# Patient Record
Sex: MALE | Race: WHITE | NOT HISPANIC OR LATINO | Employment: FULL TIME | URBAN - METROPOLITAN AREA
[De-identification: names, ages, dates, MRNs, and addresses within clinical notes are randomized per-mention and may not be internally consistent; named-entity substitution may affect disease eponyms.]

---

## 2017-05-17 ENCOUNTER — OFFICE VISIT (OUTPATIENT)
Dept: FAMILY MEDICINE | Facility: CLINIC | Age: 37
End: 2017-05-17
Payer: COMMERCIAL

## 2017-05-17 VITALS — RESPIRATION RATE: 16 BRPM | TEMPERATURE: 98.5 F

## 2017-05-17 DIAGNOSIS — Z23 NEED FOR VACCINATION: ICD-10-CM

## 2017-05-17 DIAGNOSIS — Z71.84 TRAVEL ADVICE ENCOUNTER: Primary | ICD-10-CM

## 2017-05-17 PROCEDURE — 99402 PREV MED CNSL INDIV APPRX 30: CPT | Mod: 25 | Performed by: NURSE PRACTITIONER

## 2017-05-17 PROCEDURE — 90686 IIV4 VACC NO PRSV 0.5 ML IM: CPT | Mod: GA | Performed by: NURSE PRACTITIONER

## 2017-05-17 PROCEDURE — 90471 IMMUNIZATION ADMIN: CPT | Mod: GA | Performed by: NURSE PRACTITIONER

## 2017-05-17 RX ORDER — AZITHROMYCIN 500 MG/1
500 TABLET, FILM COATED ORAL DAILY
Qty: 3 TABLET | Refills: 0 | Status: SHIPPED | OUTPATIENT
Start: 2017-05-17 | End: 2017-05-20

## 2017-05-17 RX ORDER — ATOVAQUONE AND PROGUANIL HYDROCHLORIDE 250; 100 MG/1; MG/1
1 TABLET, FILM COATED ORAL DAILY
Qty: 30 TABLET | Refills: 0 | Status: SHIPPED | OUTPATIENT
Start: 2017-05-17 | End: 2018-07-19

## 2017-05-17 NOTE — PATIENT INSTRUCTIONS
Today May 17, 2017 you received the    Flu Vaccine  .    These appointments can be made as a NURSE ONLY visit.    **It is very important for the vaccinations to be given on the scheduled day(s), this helps ensure you receive the full effectiveness of the vaccine.**    Please call [facility] with any questions [phone]    Thank you for visiting [facility]

## 2017-05-17 NOTE — MR AVS SNAPSHOT
After Visit Summary   5/17/2017    Raúl Haque    MRN: [id number]           Patient Information     Date Of Birth          1980        Visit Information        Provider Department      5/17/2017 8:00 AM Michelle Dang APRN CNP [facility]        Todays Diagnoses     Travel advice encounter    -  1    Need for vaccination          Care Instructions    Today May 17, 2017 you received the    Flu Vaccine  .    These appointments can be made as a NURSE ONLY visit.    **It is very important for the vaccinations to be given on the scheduled day(s), this helps ensure you receive the full effectiveness of the vaccine.**    Please call [facility] with any questions [phone]    Thank you for visiting [facility]            Follow-ups after your visit        Who to contact     If you have questions or need follow up information about [facility] clinic visit or your schedule please contact [facility] directly at [phone].  Normal or non-critical lab and imaging results will be communicated to you by [email or web address], letter or phone within 4 business days after the clinic has received the results. If you do not hear from us within 7 days, please contact the clinic through [email or web address] or phone. If you have a critical or abnormal lab result, we will notify you by phone as soon as possible.  Submit refill requests through [place] or call your pharmacy and they will forward the refill request to us. Please allow 3 business days for your refill to be completed.          Additional Information About Your Visit        [email or web address] Information     [place] gives you secure access to your electronic health record. If you see a primary care provider, you can also send messages to your care team and make appointments. If you have questions, please call your primary care clinic.  If you do not have a primary care provider, please call [phone] and they will  assist you.        Care EveryWhere ID     This is your Care EveryWhere ID. This could be used by other organizations to access your [facility] medical records  [id number]        Your Vitals Were     Temperature Respirations                98.5  F (36.9  C) (Oral) 16           Blood Pressure from Last 3 Encounters:   03/04/16 134/88   08/04/14 142/83    Weight from Last 3 Encounters:   03/04/16 245 lb 11.2 oz (111.4 kg)   08/04/14 224 lb 9.6 oz (101.9 kg)              We Performed the Following     HC FLU VAC PRESRV FREE QUAD SPLIT VIR 3+YRS IM          Today's Medication Changes          These changes are accurate as of: 5/17/17  8:46 AM.  If you have any questions, ask your nurse or doctor.               Start taking these medicines.        Dose/Directions    atovaquone-proguanil 250-100 MG per tablet   Commonly known as:  MALARONE   Used for:  Need for vaccination, Travel advice encounter   Started by:  Michelle Dang APRN CNP        Dose:  1 tablet   Take 1 tablet by mouth daily Start 2 days before exposure to Malaria and continue daily till  7 days after exposure.   Quantity:  30 tablet   Refills:  0       azithromycin 500 MG tablet   Commonly known as:  ZITHROMAX   Used for:  Travel advice encounter   Started by:  Michelle Dang APRN CNP        Dose:  500 mg   Take 1 tablet (500 mg) by mouth daily for 3 doses Take 1 tablet a day for up to 3 days for severe diarrhea   Quantity:  3 tablet   Refills:  0       typhoid CR capsule   Commonly known as:  VIVOTIF   Used for:  Need for vaccination, Travel advice encounter   Started by:  Michelle Dang APRN CNP        Dose:  1 capsule   Take 1 capsule by mouth every other day   Quantity:  4 capsule   Refills:  0            Where to get your medicines      These medications were sent to [facility] [address] [place] [address]  [address] [place] [address]     Phone:  [phone]     atovaquone-proguanil 250-100 MG per tablet     azithromycin 500 MG tablet    typhoid CR capsule                Primary Care Provider Office Phone # Fax #    Kati Clement [phone]       [facility] [address] [address]        Thank you!     Thank you for choosing [facility]  for your care. Our goal is always to provide you with excellent care. Hearing back from our patients is one way we can continue to improve our services. Please take a few minutes to complete the written survey that you may receive in the mail after your visit with us. Thank you!             Your Updated Medication List - Protect others around you: Learn how to safely use, store and throw away your medicines at www.disposemymeds.org.          This list is accurate as of: 5/17/17  8:46 AM.  Always use your most recent med list.                   Brand Name Dispense Instructions for use    atovaquone-proguanil 250-100 MG per tablet    MALARONE    30 tablet    Take 1 tablet by mouth daily Start 2 days before exposure to Malaria and continue daily till  7 days after exposure.       azithromycin 500 MG tablet    ZITHROMAX    3 tablet    Take 1 tablet (500 mg) by mouth daily for 3 doses Take 1 tablet a day for up to 3 days for severe diarrhea       typhoid CR capsule    VIVOTIF    4 capsule    Take 1 capsule by mouth every other day

## 2017-05-17 NOTE — NURSING NOTE
"Chief Complaint   Patient presents with     Travel Clinic     [place]      Temp 98.5  F (36.9  C) (Oral)  Resp 16 Estimated body mass index is 38.2 kg/(m^2) as calculated from the following:    Height as of 3/4/16: 5' 7.25\" (1.708 m).    Weight as of 3/4/16: 245 lb 11.2 oz (111.4 kg).  bp completed using cuff size: NA (Not Taken)       Health Maintenance addressed:  NONE    n/a    Lala Curry MA     "

## 2017-05-17 NOTE — NURSING NOTE
Screening Questionnaire for Adult Immunization    Are you sick today?   No   Do you have allergies to medications, food, a vaccine component or latex?   No   Have you ever had a serious reaction after receiving a vaccination?   No   Do you have a long-term health problem with heart disease, lung disease, asthma, kidney disease, metabolic disease (e.g. diabetes), anemia, or other blood disorder?   No   Do you have cancer, leukemia, HIV/AIDS, or any other immune system problem?   No   In the past 3 months, have you taken medications that affect  your immune system, such as prednisone, other steroids, or anticancer drugs; drugs for the treatment of rheumatoid arthritis, Crohn s disease, or psoriasis; or have you had radiation treatments?   No   Have you had a seizure, or a brain or other nervous system problem?   No   During the past year, have you received a transfusion of blood or blood     products, or been given immune (gamma) globulin or antiviral drug?   No   For women: Are you pregnant or is there a chance you could become        pregnant during the next month?   No   Have you received any vaccinations in the past 4 weeks?   No     Immunization questionnaire answers were all negative.      MNVFC doesn't apply on this patient    Prior to injection verified patient identity using patient's name and date of birth.    Per orders of DONOVAN Dang, injection of Flu  given by Lala Curry. Patient instructed to remain in clinic for 20 minutes afterwards, and to report any adverse reaction to me immediately.       Screening performed by Lala Curry on 5/17/2017 at 8:58 AM.

## 2017-05-17 NOTE — PROGRESS NOTES
Nurse Note      Itinerary:  [place]       Departure Date:       Return Date:       Length of Trip 6 weeks       Reason for Travel: Adoption           Urban or rural: both      Accommodations: Apartment        IMMUNIZATION HISTORY  Have you received any immunizations within the past 4 weeks?  No  Have you ever fainted from having your blood drawn or from an injection?  No  Have you ever had a fever reaction to vaccination?  No  Have you ever had any bad reaction or side effect from any vaccination?  No  Have you ever had hepatitis A or B vaccine?  Yes  Do you live (or work closely) with anyone who has AIDS, an AIDS-like condition, any other immune disorder or who is on chemotherapy for cancer?  Yes  Do you have a family history of immunodeficiency?  No  Have you received any injection of immune globulin or any blood products during the past 12 months?  No    Patient roomed by Lala Curry MA       Subjective  Raúl Haque is a 37 year old male seen today with spouse  with children for counsultation for international travel to [place] for adoption.  Patient will be departing in  Within 6 week(s) and staying for   2 month(s) and  traveling with family member(s).      Patient itinerary :  will be in the [place] region [place] which presents risk for Malaria, Dengue Fever, Chikungungya, Zika, Schistosomiasis, food borne illnesses, motor vehicle accidents and Typhoid. exposure.      Patient's activities will include sightseeing and adoption.    Patient's country of birth is [place]    Special medical concerns: none  Pre-travel questionnaire was completed by patient and reviewed by provider.     Vitals: Temp 98.5  F (36.9  C) (Oral)  Resp 16  BMI= There is no height or weight on file to calculate BMI.    EXAM:  General:  Well-nourished, well-developed in no acute distress.  Appears to be stated age, interacts appropriately and expresses understanding of information given to  patient.    Current Outpatient Prescriptions   Medication Sig Dispense Refill     atovaquone-proguanil (MALARONE) 250-100 MG per tablet Take 1 tablet by mouth daily Start 2 days before exposure to Malaria and continue daily till  7 days after exposure. 30 tablet 0     typhoid (VIVOTIF) CR capsule Take 1 capsule by mouth every other day 4 capsule 0     azithromycin (ZITHROMAX) 500 MG tablet Take 1 tablet (500 mg) by mouth daily for 3 doses Take 1 tablet a day for up to 3 days for severe diarrhea 3 tablet 0     Patient Active Problem List   Diagnosis     Depression, major, in remission (H)     Obesity     Snoring     No Known Allergies      Immunizations discussed include:   Hepatitis A:  Up to date  Hepatitis B: Up to date  Influenza: Ordered/given today, risks, benefits and side effects reviewed  Typhoid: oral Rx given  Rabies: Declined  Not concerned about risk of disease  Yellow Fever: Not indicated  Japanese Encephalitis: Not indicated  Meningococcus: Not indicated  Tetanus/Diphtheria: Up to date  Measles/Mumps/Rubella: Up to date  Cholera: Not needed  Polio: Up to date  Pneumococcal: Under age of 65  Varicella: Immune by disease history per patient report  Zostavax:  Not indicated  HPV:  Not indicated  TB:  Possible post travel    Altitude Exposure on this trip: no    ASSESSMENT/PLAN:    ICD-10-CM    1. Travel advice encounter Z71.89 atovaquone-proguanil (MALARONE) 250-100 MG per tablet     typhoid (VIVOTIF) CR capsule     azithromycin (ZITHROMAX) 500 MG tablet   2. Need for vaccination Z23 atovaquone-proguanil (MALARONE) 250-100 MG per tablet     typhoid (VIVOTIF) CR capsule     HC FLU VAC PRESRV FREE QUAD SPLIT VIR 3+YRS IM     I have reviewed general recommendations for safe travel   including: food/water precautions, insect precautions, safer sex   practices given high prevalence of Zika,    roadway safety. Educational materials and [facility] report provided.    Malaraia prophylaxis recommended:  Sergo  Symptomatic treatment for traveler's diarrhea: azithromycin  Altitude illness prevention and treatment: no      Evacuation insurance advised and resources were provided to patient.    Total visit time 30 minutes  with over 50% of time spent counseling patient as detailed above.    Michelle Dang CNP

## 2018-07-02 ENCOUNTER — TELEPHONE (OUTPATIENT)
Dept: FAMILY MEDICINE | Facility: CLINIC | Age: 38
End: 2018-07-02

## 2018-07-02 NOTE — TELEPHONE ENCOUNTER
Reason for call:  Order   Order or referral being requested: Pt would like a referral for a sleep study to be placed for him  Reason for request: He had spoken to Emma about it 3/2016 and a referral had been placed at that time, but it has since .  Date needed: as soon as possible  Has the patient been seen by the PCP for this problem? YES    Additional comments: The patient hasn't been seen since 3/2016    Phone number to reach patient:  Home number on file [phone] (home)    Best Time:  Any    Can we leave a detailed message on this number?  YES

## 2018-07-02 NOTE — TELEPHONE ENCOUNTER
Appointment scheduled for 7/10/18 at 0800    Elizabeth Arcos, BSN RN  [facility]

## 2018-07-09 NOTE — PROGRESS NOTES
SUBJECTIVE:   CC: Raúl Haque is an 38 year old male who presents for preventative health visit.     Healthy Habits:    Do you get at least three servings of calcium containing foods daily (dairy, green leafy vegetables, etc.)? yes    Amount of exercise or daily activities, outside of work: 1 day(s) per week    Problems taking medications regularly not applicable    Medication side effects: No    Have you had an eye exam in the past two years? yes    Do you see a dentist twice per year? yes    Do you have sleep apnea, excessive snoring or daytime drowsiness? YES- wanting a sleep study   -had a referral to sleep medicine a few years ago, and would like to have that done today. Has continued to have symptoms of loud snoring, daytime sleepiness. Father and brother both have sleep apnea.    Today's PHQ-2 Score:   PHQ-2 ( 1999 Pfizer) 7/10/2018 3/4/2016   Q1: Little interest or pleasure in doing things 0 0   Q2: Feeling down, depressed or hopeless 0 0   PHQ-2 Score 0 0       Abuse: Current or Past(Physical, Sexual or Emotional)- No  Do you feel safe in your environment - Yes    Social History   Substance Use Topics     Smoking status: Never Smoker     Smokeless tobacco: Never Used     Alcohol use Yes      Comment: 3 times per week      If you drink alcohol do you typically have >3 drinks per day or >7 drinks per week? No                      Last PSA: No results found for: PSA    Reviewed orders with patient. Reviewed health maintenance and updated orders accordingly - Yes  Labs reviewed in EPIC    Reviewed and updated as needed this visit by clinical staff  Tobacco  Allergies  Meds         Reviewed and updated as needed this visit by Provider            ROS:  CONSTITUTIONAL: NEGATIVE for fever, chills, change in weight  INTEGUMENTARY/SKIN: NEGATIVE for worrisome rashes, moles or lesions  EYES: NEGATIVE for vision changes or irritation  ENT: NEGATIVE for ear, mouth and throat problems  RESP: NEGATIVE for  "significant cough or SOB  CV: NEGATIVE for chest pain, palpitations or peripheral edema  GI: NEGATIVE for nausea, abdominal pain, heartburn, or change in bowel habits   male: negative for dysuria, hematuria, decreased urinary stream, erectile dysfunction, urethral discharge  MUSCULOSKELETAL: NEGATIVE for significant arthralgias or myalgia  NEURO: NEGATIVE for weakness, dizziness or paresthesias  PSYCHIATRIC: NEGATIVE for changes in mood or affect    OBJECTIVE:   /82  Pulse 72  Temp 98.2  F (36.8  C) (Oral)  Resp 18  Ht 5' 7.25\" (1.708 m)  Wt 241 lb 11.2 oz (109.6 kg)  SpO2 97%  BMI 37.57 kg/m2  EXAM:  GENERAL: healthy, alert and no distress  NECK: no adenopathy, no asymmetry, masses, or scars and thyroid normal to palpation  RESP: lungs clear to auscultation - no rales, rhonchi or wheezes  CV: regular rate and rhythm, normal S1 S2, no S3 or S4, no murmur, click or rub, no peripheral edema and peripheral pulses strong  ABDOMEN: soft, nontender, no hepatosplenomegaly, no masses and bowel sounds normal  MS: no gross musculoskeletal defects noted, no edema  PSYCH: mentation appears normal, affect normal/bright    Diagnostic Test Results:  none     ASSESSMENT/PLAN:   1. Routine general medical examination at a health care facility      2. Lipid screening    - Lipid panel reflex to direct LDL Fasting    3. Excessive daytime sleepiness    - SLEEP EVALUATION & MANAGEMENT REFERRAL - [facility]  [phone] (Age 2 and up); Future    COUNSELING:  Reviewed preventive health counseling, as reflected in patient instructions       Regular exercise       Healthy diet/nutrition    BP Readings from Last 1 Encounters:   07/10/18 138/82     Estimated body mass index is 37.57 kg/(m^2) as calculated from the following:    Height as of this encounter: 5' 7.25\" (1.708 m).    Weight as of this encounter: 241 lb 11.2 oz (109.6 kg).      Weight management plan: Patient " was referred to their PCP to discuss a diet and exercise plan.     reports that he has never smoked. He has never used smokeless tobacco.      Counseling Resources:  ATP IV Guidelines  Pooled Cohorts Equation Calculator  FRAX Risk Assessment  ICSI Preventive Guidelines  Dietary Guidelines for Americans, 2010  USDA's MyPlate  ASA Prophylaxis  Lung CA Screening    JAROCHO Stone CNP  [facility]

## 2018-07-10 ENCOUNTER — OFFICE VISIT (OUTPATIENT)
Dept: FAMILY MEDICINE | Facility: CLINIC | Age: 38
End: 2018-07-10
Payer: COMMERCIAL

## 2018-07-10 VITALS
WEIGHT: 241.7 LBS | BODY MASS INDEX: 37.93 KG/M2 | DIASTOLIC BLOOD PRESSURE: 82 MMHG | HEART RATE: 72 BPM | OXYGEN SATURATION: 97 % | HEIGHT: 67 IN | TEMPERATURE: 98.2 F | RESPIRATION RATE: 18 BRPM | SYSTOLIC BLOOD PRESSURE: 138 MMHG

## 2018-07-10 DIAGNOSIS — Z00.00 ROUTINE GENERAL MEDICAL EXAMINATION AT A HEALTH CARE FACILITY: Primary | ICD-10-CM

## 2018-07-10 DIAGNOSIS — Z13.220 LIPID SCREENING: ICD-10-CM

## 2018-07-10 DIAGNOSIS — G47.19 EXCESSIVE DAYTIME SLEEPINESS: ICD-10-CM

## 2018-07-10 LAB
CHOLEST SERPL-MCNC: 148 MG/DL
HDLC SERPL-MCNC: 30 MG/DL
LDLC SERPL CALC-MCNC: 72 MG/DL
NONHDLC SERPL-MCNC: 118 MG/DL
TRIGL SERPL-MCNC: 228 MG/DL

## 2018-07-10 PROCEDURE — 80061 LIPID PANEL: CPT | Performed by: NURSE PRACTITIONER

## 2018-07-10 PROCEDURE — 99395 PREV VISIT EST AGE 18-39: CPT | Performed by: NURSE PRACTITIONER

## 2018-07-10 PROCEDURE — 36415 COLL VENOUS BLD VENIPUNCTURE: CPT | Performed by: NURSE PRACTITIONER

## 2018-07-10 NOTE — MR AVS SNAPSHOT
After Visit Summary   7/10/2018    Raúl Haque    MRN: [id number]           Patient Information     Date Of Birth          1980        Visit Information        Provider Department      7/10/2018 8:00 AM Emma Abbott APRN [facility]        Today's Diagnoses     Routine general medical examination at a health care facility    -  1    Lipid screening        Excessive daytime sleepiness          Care Instructions      Preventive Health Recommendations  Male Ages 26 - 39    Yearly exam:             See your health care provider every year in order to  o   Review health changes.   o   Discuss preventive care.    o   Review your medicines if your doctor has prescribed any.    You should be tested each year for STDs (sexually transmitted diseases), if you re at risk.     After age 35, talk to your provider about cholesterol testing. If you are at risk for heart disease, have your cholesterol tested at least every 5 years.     If you are at risk for diabetes, you should have a diabetes test (fasting glucose).  Shots: Get a flu shot each year. Get a tetanus shot every 10 years.     Nutrition:    Eat at least 5 servings of fruits and vegetables daily.     Eat whole-grain bread, whole-wheat pasta and brown rice instead of white grains and rice.     Get adequate Calcium and Vitamin D.     Lifestyle    Exercise for at least 150 minutes a week (30 minutes a day, 5 days a week). This will help you control your weight and prevent disease.     Limit alcohol to one drink per day.     No smoking.     Wear sunscreen to prevent skin cancer.     See your dentist every six months for an exam and cleaning.             Follow-ups after your visit        Additional Services     SLEEP EVALUATION & MANAGEMENT REFERRAL - ADULT -[facility] / [facility]  [phone] (Age 2 and up)       Please be aware that coverage of these services is subject to the terms  and limitations of your health insurance plan.  Call member services at your health plan with any benefit or coverage questions.      Please bring the following to your appointment:    >>   List of current medications   >>   This referral request   >>   Any documents/labs given to you for this referral                      Future tests that were ordered for you today     Open Future Orders        Priority Expected Expires Ordered    SLEEP EVALUATION & MANAGEMENT REFERRAL - ADULT -[facility]  [phone] (Age 2 and up) Routine  7/10/2019 7/10/2018            Who to contact     If you have questions or need follow up information about today's clinic visit or your schedule please contact [facility] directly at [phone].  Normal or non-critical lab and imaging results will be communicated to you by MyChart, letter or phone within 4 business days after the clinic has received the results. If you do not hear from us within 7 days, please contact the clinic through JollyDe[employer]hart or phone. If you have a critical or abnormal lab result, we will notify you by phone as soon as possible.  Submit refill requests through [employer] or call your pharmacy and they will forward the refill request to us. Please allow 3 business days for your refill to be completed.          Additional Information About Your Visit        MyChart Information     [employer] gives you secure access to your electronic health record. If you see a primary care provider, you can also send messages to your care team and make appointments. If you have questions, please call your primary care clinic.  If you do not have a primary care provider, please call [phone] and they will assist you.        Care EveryWhere ID     This is your Care EveryWhere ID. This could be used by other organizations to access your [facility] medical records  [id number]        Your Vitals Were     Pulse Temperature  "Respirations Height Pulse Oximetry BMI (Body Mass Index)    72 98.2  F (36.8  C) (Oral) 18 5' 7.25\" (1.708 m) 97% 37.57 kg/m2       Blood Pressure from Last 3 Encounters:   07/10/18 138/82   03/04/16 134/88   08/04/14 142/83    Weight from Last 3 Encounters:   07/10/18 241 lb 11.2 oz (109.6 kg)   03/04/16 245 lb 11.2 oz (111.4 kg)   08/04/14 224 lb 9.6 oz (101.9 kg)              We Performed the Following     Lipid panel reflex to direct LDL Fasting        Primary Care Provider Office Phone # Fax #    Kati Clement [phone]       [facility] [address] [place] [address] [address]  [facility] [address]        Equal Access to Services     [facility]: Hadii natalie godwin hadasho Soomaali, waaxda luqadaha, qaybta kaalmada adeegyada, elias hurt hayjesus black . So [facility] [phone].    ATENCIÓN: Si habla español, tiene a conner disposición servicios gratuitos de asistencia lingüística. Jassi al [phone].    We comply with applicable federal civil rights laws and [place] laws. We do not discriminate on the basis of race, color, national origin, age, disability, sex, sexual orientation, or gender identity.            Thank you!     Thank you for choosing [facility]  for your care. Our goal is always to provide you with excellent care. Hearing back from our patients is one way we can continue to improve our services. Please take a few minutes to complete the written survey that you may receive in the mail after your visit with us. Thank you!             Your Updated Medication List - Protect others around you: Learn how to safely use, store and throw away your medicines at www.disposemymeds.org.          This list is accurate as of 7/10/18  8:21 AM.  Always use your most recent med list.                   Brand Name Dispense Instructions for use Diagnosis    atovaquone-proguanil 250-100 MG per tablet    MALARONE    30 tablet    Take 1 tablet by mouth daily Start 2 days before " exposure to Malaria and continue daily till  7 days after exposure.    Need for vaccination, Travel advice encounter       typhoid CR capsule    VIVOTIF    4 capsule    Take 1 capsule by mouth every other day    Need for vaccination, Travel advice encounter

## 2018-07-19 ENCOUNTER — OFFICE VISIT (OUTPATIENT)
Dept: SLEEP MEDICINE | Facility: CLINIC | Age: 38
End: 2018-07-19
Payer: COMMERCIAL

## 2018-07-19 VITALS
SYSTOLIC BLOOD PRESSURE: 138 MMHG | HEART RATE: 78 BPM | HEIGHT: 67 IN | DIASTOLIC BLOOD PRESSURE: 87 MMHG | RESPIRATION RATE: 16 BRPM | WEIGHT: 240 LBS | OXYGEN SATURATION: 96 % | BODY MASS INDEX: 37.67 KG/M2

## 2018-07-19 DIAGNOSIS — G47.33 OSA (OBSTRUCTIVE SLEEP APNEA): Primary | ICD-10-CM

## 2018-07-19 DIAGNOSIS — G47.19 EXCESSIVE DAYTIME SLEEPINESS: ICD-10-CM

## 2018-07-19 PROCEDURE — 99205 OFFICE O/P NEW HI 60 MIN: CPT | Performed by: INTERNAL MEDICINE

## 2018-07-19 NOTE — MR AVS SNAPSHOT
After Visit Summary   7/19/2018    Raúl Haque    MRN: [id number]           Patient Information     Date Of Birth          1980        Visit Information        Provider Department      7/19/2018 9:30 AM Dante Watson MD [facility], [place], Sleep Study        Today's Diagnoses     BURT (obstructive sleep apnea)    -  1    Excessive daytime sleepiness          Care Instructions    SLEEP HYGIENE TIPS:    1. Don t go to bed unless you are sleepy.   If you are not sleepy at bedtime, then do something else. Read a book, listen to soft music or browse through a magazine. Find something relaxing, but not stimulating, to take your mind off of worries about sleep. This will relax your body and distract your mind.     2. If you are not asleep after 20 minutes, then get out of the bed.   Find something else to do that will make you feel relaxed. If you can, do this in another room. Your bedroom should be where you go to sleep. It is not a place to go when you are bored. Once you feel sleepy again, go back to bed.     3. Begin rituals that help you relax each night before bed.   This can include such things as a warm bath, light snack or a few minutes of reading.     4. Get up at the same time every morning.   Do this even on weekends and holidays.     5. Get a full night s sleep on a regular basis.   Get enough sleep so that you feel well-rested nearly every day.     6. Avoid taking naps if you can.   If you must take a nap, try to keep it short (less than one hour). Never take a nap after 3 p.m.     7. Keep a regular schedule.   Regular times for meals, medications, chores, and other activities help keep the inner body clock running smoothly.     8. Don t read, write, eat, watch TV, talk on the phone, or play cards in bed.     9. Do not have any caffeine after lunch.     10. Do not have a beer, a glass of wine, or any other alcohol within six hours of your bedtime.     11. Do not have a cigarette or any  other source of nicotine before bedtime.     12. Do not go to bed hungry, but don t eat a big meal near bedtime either.     13. Avoid any tough exercise within six hours of your bedtime.   You should exercise on a regular basis, but do it earlier in the day. (Talk to your doctor before you begin an exercise program.)     14. Avoid sleeping pills, or use them cautiously.   Most doctors do not prescribe sleeping pills for periods of more than three weeks. Do not drink alcohol while taking sleeping pills.     15. Try to get rid of or deal with things that make you worry.   If you are unable to do this, then find a time during the day to get all of your worries out of your system. Your bed is a place to rest, not a place to worry.     16. Make your bedroom quiet, dark, and a little bit cool.   An easy way to remember this: it should remind you of a cave. While this may not sound romantic, it seems to work for bats. Bats are champion sleepers. They get about 16 hours of sleep each day. Maybe it s because they sleep in dark, cool caves.       Your BMI is Body mass index is 37.36 kg/(m^2).  Weight management is a personal decision.  If you are interested in exploring weight loss strategies, the following discussion covers the approaches that may be successful. Body mass index (BMI) is one way to tell whether you are at a healthy weight, overweight, or obese. It measures your weight in relation to your height.  A BMI of 18.5 to 24.9 is in the healthy range. A person with a BMI of 25 to 29.9 is considered overweight, and someone with a BMI of 30 or greater is considered obese. More than two-thirds of American adults are considered overweight or obese.  Being overweight or obese increases the risk for further weight gain. Excess weight may lead to heart disease and diabetes.  Creating and following plans for healthy eating and physical activity may help you improve your health.  Weight control is part of healthy lifestyle and  includes exercise, emotional health, and healthy eating habits. Careful eating habits lifelong are the mainstay of weight control. Though there are significant health benefits from weight loss, long-term weight loss with diet alone may be very difficult to achieve- studies show long-term success with dietary management in less than 10% of people. Attaining a healthy weight may be especially difficult to achieve in those with severe obesity. In some cases, medications, devices and surgical management might be considered.  What can you do?  If you are overweight or obese and are interested in methods for weight loss, you should discuss this with your provider.     Consider reducing daily calorie intake by 500 calories.     Keep a food journal.     Avoiding skipping meals, consider cutting portions instead.    Diet combined with exercise helps maintain muscle while optimizing fat loss. Strength training is particularly important for building and maintaining muscle mass. Exercise helps reduce stress, increase energy, and improves fitness. Increasing exercise without diet control, however, may not burn enough calories to loose weight.       Start walking three days a week 10-20 minutes at a time    Work towards walking thirty minutes five days a week     Eventually, increase the speed of your walking for 1-2 minutes at time    In addition, we recommend that you review healthy lifestyles and methods for weight loss available through the National Institutes of Health patient information sites:  http://win.niddk.nih.gov/publications/index.htm    And look into health and wellness programs that may be available through your health insurance provider, employer, local community center, or hodan club.    Weight management plan: Patient was referred to their PCP to discuss a diet and exercise plan.              Follow-ups after your visit        Follow-up notes from your care team     Return in about 2 weeks (around 8/2/2018).       Your next 10 appointments already scheduled     Sep 10, 2018  8:00 PM CDT   PSG Split with SLEEP STUDY RM 2   [facility])    [address] [facility] [address] [phone]            Sep 27, 2018  9:00 AM CDT   Return Sleep Patient with Dante Watson MD   [facility], Sleep Study ([facility])    [address] [facility] [address] [phone]              Future tests that were ordered for you today     Open Future Orders        Priority Expected Expires Ordered    Comprehensive Sleep Study Routine  1/15/2019 7/19/2018            Who to contact     If you have questions or need follow up information about today's clinic visit or your schedule please contact [facility] directly at [phone].  Normal or non-critical lab and imaging results will be communicated to you by MyChart, letter or phone within 4 business days after the clinic has received the results. If you do not hear from us within 7 days, please contact the clinic through [employer] or phone. If you have a critical or abnormal lab result, we will notify you by phone as soon as possible.  Submit refill requests through [employer] or call your pharmacy and they will forward the refill request to us. Please allow 3 business days for your refill to be completed.          Additional Information About Your Visit        MyChart Information     [employer] gives you secure access to your electronic health record. If you see a primary care provider, you can also send messages to your care team and make appointments. If you have questions, please call your primary care clinic.  If you do not have a primary care provider, please call [phone] and they will assist you.        Care EveryWhere ID     This is your Care EveryWhere ID. This could be used by other organizations to access your [facility]  "records  [id number]        Your Vitals Were     Pulse Respirations Height Pulse Oximetry BMI (Body Mass Index)       78 16 1.707 m (5' 7.21\") 96% 37.36 kg/m2        Blood Pressure from Last 3 Encounters:   07/19/18 138/87   07/10/18 138/82   03/04/16 134/88    Weight from Last 3 Encounters:   07/19/18 108.9 kg (240 lb)   07/10/18 109.6 kg (241 lb 11.2 oz)   03/04/16 111.4 kg (245 lb 11.2 oz)              We Performed the Following     SLEEP EVALUATION & MANAGEMENT REFERRAL - [facility]  [phone] (Age 2 and up)        Primary Care Provider Office Phone # Fax #    Emma JAROCHO [facility] [phone]       [address]  [facility] [address]        Equal Access to Services     KIMBERLEE ORTIZ : Hadii aad ku hadasho Soomaali, waaxda luqadaha, qaybta kaalmada adeegyada, waxay idiin hayjeffryn carie black . So [facility] [phone].    ATENCIÓN: Si habla español, tiene a conner disposición servicios gratuitos de asistencia lingüística. Llame al [phone].    We comply with applicable federal civil rights laws and [place] laws. We do not discriminate on the basis of race, color, national origin, age, disability, sex, sexual orientation, or gender identity.            Thank you!     Thank you for choosing [facility], SLEEP STUDY  for your care. Our goal is always to provide you with excellent care. Hearing back from our patients is one way we can continue to improve our services. Please take a few minutes to complete the written survey that you may receive in the mail after your visit with us. Thank you!             Your Updated Medication List - Protect others around you: Learn how to safely use, store and throw away your medicines at www.disposemymeds.org.      Notice  As of 7/19/2018 10:27 AM    You have not been prescribed any medications.      "

## 2018-07-19 NOTE — NURSING NOTE
"    Chief Complaint   Patient presents with     Consult     Snoring       Initial /87  Pulse 78  Resp 16  Ht 1.707 m (5' 7.21\")  Wt 108.9 kg (240 lb)  SpO2 96%  BMI 37.36 kg/m2 Estimated body mass index is 37.36 kg/(m^2) as calculated from the following:    Height as of this encounter: 1.707 m (5' 7.21\").    Weight as of this encounter: 108.9 kg (240 lb).    Medication Reconciliation: complete    Neck circumference: 17.25 inches / 43.5 centimeters.    DME:     Nellie Saldivar [facility]       "

## 2018-07-19 NOTE — PROGRESS NOTES
SLEEP MEDICINE CLINIC NOTE   [facility] SLEEP DISORDER CENTER  Raúl Haque 38 year old male  : 1980  MRN: [id number]      PRIMARY CARE PROVIDER: Emma Abbott    REFERRING PROVIDER: JAROCHO Stone CNP  [address]  [place] [address]     DATE OF SERVICE:  2018.      REASON FOR VISIT:  Poor nighttime sleep, tiredness during the day, concern for sleep apnea.      HISTORY OF PRESENT ILLNESS:  The patient is a very pleasant 38-year-old gentleman who is seen today with his wife for complaints of poor nighttime sleep, excessive tiredness during the day and concern for sleep apnea given loud snoring, as well as family history of sleep apnea.      The patient reports that his current routine is going to bed around 10:00 p.m.  He usually reads news on his phone for about half an hour.  When he puts his phone down around 10:30, it takes him 1 or 2 minutes to fall asleep.  He reports waking up spontaneously 3-4 times through the night, either because he is uncomfortable or tossing and turning.  Then he has ruminating thoughts during these periods.  He puts his headphone on with a podcast and falls asleep within 1 or 2 minutes.  He finally wakes up anywhere between 6:30 and 7:30 without the help of an alarm.  He does not feel rested upon awakening.  He reports some sleep inertia.  He denies being excessively sleepy during the day.  His [place] Sleepiness Score is 0/24 with no chances of falling asleep while driving.  He very rarely takes naps during the day.  He overall estimates getting about 7-1/2 hours of sleep on any given day.      He denies any features of motor restlessness suggestive of restless legs syndrome.  He denies frequent dreams or nightmares, dream enactment behavior or other forms of parasomnias.  He does not have bruxism.      He reports occasionally waking up with a headache in the morning.  He reports snoring which is loud in intensity.  He has  "occasional snort arousals, but there are no witnessed apneas.  According to his wife, the snoring sound is more during exhalation than during inhalation.  He does not have any difficulty breathing through his nose.  He does not often wake up with a dry throat or mouth.  He does not have GERD.  He prefers to sleep on his side or in the prone position.      He denies any features of hypocretin deficiency including hypnagogic or hypnopompic hallucinations, sleep paralysis or cataplexy.      SOCIAL HISTORY:  The patient is , lives at home with his wife and 3 kids, a 7-year-old, 5-year-old and 4-year-old.  His wife is pregnant, and they are expecting within the next 2 weeks.  He works in [employer] most weekdays from 9:00 a.m. to 5:00 p.m.  He denies smoking.  Reports alcohol about 2-3 drinks per week.      FAMILY HISTORY:  Mother passed away from lung cancer.  She was not a smoker.  His father has obstructive sleep apnea, diabetes mellitus and hypertension.  One of his brothers has obstructive sleep apnea and depression.  His sister has depression.      PAST SURGICAL HISTORY:  Ear tube placement as a child.      PAST MEDICAL HISTORY:  Depression, off and on.      MEDICATIONS:  As needed Tylenol and ibuprofen.      REVIEW OF SYSTEMS:  A complete 14-point review of systems was performed and found negative except as noted above.      PHYSICAL EXAMINATION:   /87  Pulse 78  Resp 16  Ht 1.707 m (5' 7.21\")  Wt 108.9 kg (240 lb)  SpO2 96%  BMI 37.36 kg/m2   GENERAL:  BMI 37 kg/m2.  Pleasant gentleman, speaking in full sentences without any discomfort.   HEENT:  Neck 17.25 inches, Mallampati class II airway.  Large tongue and prominent peripheral ridging.  No other malocclusion noted.  No obstruction to flow in the nares noted.   PULMONARY:  Normal intensity breath sounds bilaterally with no added sounds.   CARDIOVASCULAR:  S1, S2 normal with no murmur, rubs or gallops.  Regular rate and rhythm.   ABDOMEN:  " Soft, nontender, nondistended, normoactive bowel sounds.   MUSCULOSKELETAL:  No lower extremity edema or upper extremity tremors.   SKIN:  No rashes on limited exam.   NEUROLOGIC:  Grossly intact.   PSYCHIATRIC:  Normal affect.      ASSESSMENT AND PLAN:  The patient is a very pleasant 38-year-old gentleman with a history of depression who is being evaluated for poor nighttime sleep, daytime fatigue and loud snoring.  The patient has several risk factors for obstructive sleep apnea.  His STOP-Bang score is 4.  Overall, however, given the severity of his symptoms, the sleep apnea severity is likely going to be mild or moderate.  We discussed the pathophysiology of obstructive sleep apnea as well as its various treatment options including positive airway pressure therapy, upper airway surgery and mandibular advancement device therapy.  We discussed that though he has some risk factors for obstructive sleep apnea but overall probability of severe obstructive sleep apnea is low which makes home sleep test unhelpful.  He is a candidate for in-lab study which was ordered according to a split-night protocol.  The patient will be seen in clinic in about 2-3 weeks after the PSG is completed to review the results and discuss treatment options.      Nonrestorative sleep:  The patient's nonrestorative sleep and excessive daytime fatigue may be related to obstructive sleep apnea as described above.  However, no other major sleep concerns have been identified.  It is possible that these may be a consequence of mood disorder or systemic medical condition.  As discussed above, we will evaluate for obstructive sleep apnea, and, if negative, then we would suggest further evaluation per his primary care provider.         I spent a total of 60 minutes face to face with Raúl Haque during today's office visit. Over 50% of this time was spent counseling the patient and/or coordinating care regarding their sleep disorder.     Dante  MD Walter   of Medicine  Pulmonary, Critical Care and Sleep Medicine  [facility]  Pager: [phone]              D: 2018   T: 2018   MT: KRISTI      Name:     DELILAH CROFT   MRN:      [id number]        Account:      [id number]   :      1980           Visit Date:   2018      Document: [id number]

## 2018-07-19 NOTE — PATIENT INSTRUCTIONS
SLEEP HYGIENE TIPS:    1. Don t go to bed unless you are sleepy.   If you are not sleepy at bedtime, then do something else. Read a book, listen to soft music or browse through a magazine. Find something relaxing, but not stimulating, to take your mind off of worries about sleep. This will relax your body and distract your mind.     2. If you are not asleep after 20 minutes, then get out of the bed.   Find something else to do that will make you feel relaxed. If you can, do this in another room. Your bedroom should be where you go to sleep. It is not a place to go when you are bored. Once you feel sleepy again, go back to bed.     3. Begin rituals that help you relax each night before bed.   This can include such things as a warm bath, light snack or a few minutes of reading.     4. Get up at the same time every morning.   Do this even on weekends and holidays.     5. Get a full night s sleep on a regular basis.   Get enough sleep so that you feel well-rested nearly every day.     6. Avoid taking naps if you can.   If you must take a nap, try to keep it short (less than one hour). Never take a nap after 3 p.m.     7. Keep a regular schedule.   Regular times for meals, medications, chores, and other activities help keep the inner body clock running smoothly.     8. Don t read, write, eat, watch TV, talk on the phone, or play cards in bed.     9. Do not have any caffeine after lunch.     10. Do not have a beer, a glass of wine, or any other alcohol within six hours of your bedtime.     11. Do not have a cigarette or any other source of nicotine before bedtime.     12. Do not go to bed hungry, but don t eat a big meal near bedtime either.     13. Avoid any tough exercise within six hours of your bedtime.   You should exercise on a regular basis, but do it earlier in the day. (Talk to your doctor before you begin an exercise program.)     14. Avoid sleeping pills, or use them cautiously.   Most doctors do not prescribe  sleeping pills for periods of more than three weeks. Do not drink alcohol while taking sleeping pills.     15. Try to get rid of or deal with things that make you worry.   If you are unable to do this, then find a time during the day to get all of your worries out of your system. Your bed is a place to rest, not a place to worry.     16. Make your bedroom quiet, dark, and a little bit cool.   An easy way to remember this: it should remind you of a cave. While this may not sound romantic, it seems to work for bats. Bats are champion sleepers. They get about 16 hours of sleep each day. Maybe it s because they sleep in dark, cool caves.       Your BMI is Body mass index is 37.36 kg/(m^2).  Weight management is a personal decision.  If you are interested in exploring weight loss strategies, the following discussion covers the approaches that may be successful. Body mass index (BMI) is one way to tell whether you are at a healthy weight, overweight, or obese. It measures your weight in relation to your height.  A BMI of 18.5 to 24.9 is in the healthy range. A person with a BMI of 25 to 29.9 is considered overweight, and someone with a BMI of 30 or greater is considered obese. More than two-thirds of American adults are considered overweight or obese.  Being overweight or obese increases the risk for further weight gain. Excess weight may lead to heart disease and diabetes.  Creating and following plans for healthy eating and physical activity may help you improve your health.  Weight control is part of healthy lifestyle and includes exercise, emotional health, and healthy eating habits. Careful eating habits lifelong are the mainstay of weight control. Though there are significant health benefits from weight loss, long-term weight loss with diet alone may be very difficult to achieve- studies show long-term success with dietary management in less than 10% of people. Attaining a healthy weight may be especially difficult  to achieve in those with severe obesity. In some cases, medications, devices and surgical management might be considered.  What can you do?  If you are overweight or obese and are interested in methods for weight loss, you should discuss this with your provider.     Consider reducing daily calorie intake by 500 calories.     Keep a food journal.     Avoiding skipping meals, consider cutting portions instead.    Diet combined with exercise helps maintain muscle while optimizing fat loss. Strength training is particularly important for building and maintaining muscle mass. Exercise helps reduce stress, increase energy, and improves fitness. Increasing exercise without diet control, however, may not burn enough calories to loose weight.       Start walking three days a week 10-20 minutes at a time    Work towards walking thirty minutes five days a week     Eventually, increase the speed of your walking for 1-2 minutes at time    In addition, we recommend that you review healthy lifestyles and methods for weight loss available through the National Institutes of Health patient information sites:  http://win.niddk.nih.gov/publications/index.htm    And look into health and wellness programs that may be available through your health insurance provider, employer, local community center, or hodan club.    Weight management plan: Patient was referred to their PCP to discuss a diet and exercise plan.

## 2018-09-10 ENCOUNTER — THERAPY VISIT (OUTPATIENT)
Dept: SLEEP MEDICINE | Facility: CLINIC | Age: 38
End: 2018-09-10
Payer: COMMERCIAL

## 2018-09-10 DIAGNOSIS — G47.19 EXCESSIVE DAYTIME SLEEPINESS: ICD-10-CM

## 2018-09-10 DIAGNOSIS — G47.33 OSA (OBSTRUCTIVE SLEEP APNEA): ICD-10-CM

## 2018-09-10 PROCEDURE — 95810 POLYSOM 6/> YRS 4/> PARAM: CPT | Mod: GC | Performed by: INTERNAL MEDICINE

## 2018-09-10 NOTE — MR AVS SNAPSHOT
After Visit Summary   9/10/2018    Raúl Haque    MRN: [id number]           Patient Information     Date Of Birth          1980        Visit Information        Provider Department      9/10/2018 8:00 PM SLEEP STUDY RM 2 [facility]        Today's Diagnoses     Excessive daytime sleepiness        BURT (obstructive sleep apnea)          Care Instructions    [place] [facility]    1. Your sleep study will be reviewed by a sleep physician within the next few days.     2. Please follow up in the sleep clinic as scheduled, or, make an appointment with your sleep provider to be seen within two weeks to discuss the results of the sleep study.    3. If you have any questions or problems with your treatment plan, please contact your sleep clinic provider at [phone] to further manage your condition.    4. Please review your attached medication list, and, at your follow-up appointment advise your sleep clinic provider about any changes.    5. Go to [email or web address] for more information about your sleep problems.    Richard Patricia, [facility]  September 11, 2018                Follow-ups after your visit        Your next 10 appointments already scheduled     Sep 27, 2018  9:00 AM CDT   Return Sleep Patient with Dante Watson MD   [facility] ([facility])    [address] [place] [address] [phone]              Who to contact     If you have questions or need follow up information about today's clinic visit or your schedule please contact [employer] directly at [phone].  Normal or non-critical lab and imaging results will be communicated to you by MyChart, letter or phone within 4 business days after the clinic has received the results. If you do not hear from us within 7 days, please contact the clinic through MyChart or phone. If you have a  critical or abnormal lab result, we will notify you by phone as soon as possible.  Submit refill requests through [email or web address] or call your pharmacy and they will forward the refill request to us. Please allow 3 business days for your refill to be completed.          Additional Information About Your Visit        Coresonichart Information     [email or web address] gives you secure access to your electronic health record. If you see a primary care provider, you can also send messages to your care team and make appointments. If you have questions, please call your primary care clinic.  If you do not have a primary care provider, please call [phone] and they will assist you.        Care EveryWhere ID     This is your Care EveryWhere ID. This could be used by other organizations to access your [place] medical records  [id number]         Blood Pressure from Last 3 Encounters:   07/19/18 138/87   07/10/18 138/82   03/04/16 134/88    Weight from Last 3 Encounters:   07/19/18 108.9 kg (240 lb)   07/10/18 109.6 kg (241 lb 11.2 oz)   03/04/16 111.4 kg (245 lb 11.2 oz)              We Performed the Following     Comprehensive Sleep Study        Primary Care Provider Office Phone # Fax #    JAROCHO Stone [phone]       [address] [facility] [address]  [facility] [address]        Equal Access to Services     KIMBERLEE ORTIZ : Hadii aad ku hadasho Soomaali, waaxda luqadaha, qaybta kaalmada adeegyada, waxay missyin hayjesus black . So [facility] [phone].    ATENCIÓN: Si habla español, tiene a conner disposición servicios gratuitos de asistencia lingüística. Llame al [phone].    We comply with applicable federal civil rights laws and [place] laws. We do not discriminate on the basis of race, color, national origin, age, disability, sex, sexual orientation, or gender identity.            Thank you!     Thank you for choosing [facility]  for your care. Our goal is always to provide you with  excellent care. Hearing back from our patients is one way we can continue to improve our services. Please take a few minutes to complete the written survey that you may receive in the mail after your visit with us. Thank you!             Your Updated Medication List - Protect others around you: Learn how to safely use, store and throw away your medicines at www.disposemymeds.org.      Notice  As of 9/10/2018 11:59 PM    You have not been prescribed any medications.

## 2018-09-11 NOTE — PATIENT INSTRUCTIONS
[place] [facility]    1. Your sleep study will be reviewed by a sleep physician within the next few days.     2. Please follow up in the sleep clinic as scheduled, or, make an appointment with your sleep provider to be seen within two weeks to discuss the results of the sleep study.    3. If you have any questions or problems with your treatment plan, please contact your sleep clinic provider at [phone] to further manage your condition.    4. Please review your attached medication list, and, at your follow-up appointment advise your sleep clinic provider about any changes.    5. Go to [email or web address] for more information about your sleep problems.    Richard Patricia, RPSGT  September 11, 2018

## 2018-09-14 NOTE — PROCEDURES
" SLEEP STUDY INTERPRETATION  DIAGNOSTIC POLYSOMNOGRAPHY REPORT      Patient: DELILAH CROFT  YOB: 1980  Study Date: 9/10/2018  MRN: [id number]  Referring Provider: Emma Abbott MD  Ordering Provider: Dante Watson MD    Indications for Polysomnography: The patient is a 38 y old Male who is 5' 7\" and weighs 240.0 lbs. His BMI is 37.7, [place] sleepiness scale 0.0 and neck circumference is 41.0 cm. Relevant medical history includes snoring, intermittent depression. A diagnostic polysomnogram was performed to evaluate for sleep apnea.     Polysomnogram Data: A full night polysomnogram recorded the standard physiologic parameters including EEG, EOG, EMG, ECG, nasal and oral airflow. Respiratory parameters of chest and abdominal movements were recorded with respiratory inductance plethysmography. Oxygen saturation was recorded by pulse oximetry. Hypopnea scoring rule used: 1B 4%.    Sleep Architecture: Mildly fragmented sleep with all sleep stages present.  Most arousals were due to respiratory events.   The total recording time of the polysomnogram was 444.1 minutes. The total sleep time was 404.5 minutes. Sleep latency was normal at 12.1 minutes without the use of a sleep aid. REM latency was 79.5 minutes. Arousal index was normal at 15.0 arousals per hour. Sleep efficiency was increased at 91.1%. Wake after sleep onset was 27.0 minutes. The patient spent 4.6% of total sleep time in Stage N1, 53.3% in Stage N2, 17.2% in Stage N3, and 25.0% in REM. Time in REM supine was 22.0 minutes.    Respiration: Mild obstructive sleep apnea without significant hypoxemia or evidence of sustained hypoventilation (TCM not used).  Non-supine AHI was 3, overall AHI was 8.5.  Moderate snoring.    Events ? The polysomnogram revealed a presence of 15 obstructive, 1 central, and 3 mixed apneas resulting in an apnea index of 2.8 events per hour. There were 38 obstructive hypopneas and 0 central hypopneas resulting in " an obstructive hypopnea index of 5.6 and central hypopnea index of 0 events per hour. The combined apnea/hypopnea index was 8.5 events per hour (central apnea/hypopnea index was 0.1 events per hour). The REM AHI was 20.2 events per hour. The supine AHI was 13.0 events per hour. The RERA index was 5.3 events per hour.  The RDI was 13.8 events per hour.    Snoring - was reported as moderate, continuous, in both supine and lateral positions.    Respiratory rate and pattern - was notable for normal respiratory rate and pattern.    Sustained Sleep Associated Hypoventilation - Transcutaneous carbon dioxide monitoring was not used, however significant hypoventilation was not suggested by oximetry.     Sleep Associated Hypoxemia - (Greater than 5 minutes O2 sat at or below 88%) was not present. Baseline oxygen saturation was 94.3%. Lowest oxygen saturation was 87.9%. Time spent less than or equal to 88% was 0.1 minutes. Time spent less than or equal to 89% was 0.1 minutes.    Movement Activity: mild bruxism (2 episodes).  No other abnormal sleep behaviors or abnormal EMG tone.       Periodic Limb Activity - There were 49 PLMs during the entire study. The PLM index was 7.3 movements per hour. The PLM Arousal Index was - per hour.    REM EMG Activity - Excessive transient/sustained muscle activity was not present.    Nocturnal Behavior - Abnormal sleep related behaviors were not noted during NREM / REM sleep.     Bruxism - 2 episodes, short.      Cardiac Summary: unremarkable singe-lead cardiac rhythm analysis.    The average pulse rate was 64.6 bpm. The minimum pulse rate was 50.7 bpm while the maximum pulse rate was 101.1 bpm.  Arrhythmias were not noted.      Assessment:     Mildly fragmented sleep with all sleep stages present.  Most arousals were due to respiratory events.     Mild obstructive sleep apnea without significant hypoxemia.  Non-supine AHI was 3, overall AHI was 8.5.  Moderate snoring.    Occasional bruxism  (2 episodes).  No other abnormal sleep behaviors or abnormal EMG tone.       Unremarkable singe-lead cardiac rhythm analysis.      Recommendations:    Weight loss, avoidance of alcohol and muscle relaxants, and positional therapy could be considered for mild obstructive sleep apnea.  Recommend repeat polysomnography with full night titration of positive airway pressure therapy for the control of sleep disordered breathing.    Based on the presence of mild/moderate obstructive sleep apnea and a history of depression, another option would be treatment with Auto?titrating PAP therapy with a range of 5 to 15 cmH2O. Recommend clinical follow up with sleep management team.    Patient may be a candidate for dental appliance through referral to Sleep Dentistry for the treatment of obstructive sleep apnea and/or socially disruptive snoring.    Advice regarding the risks of drowsy driving.    Suggest optimizing sleep schedule and avoiding sleep deprivation.    Diagnostic Codes:   Obstructive Sleep Apnea G47.33w    Terry Phelan MD  Sleep medicine fellow  9/14/18    9/10/2018 [place] Diagnostic Sleep Study (240.0 lbs) - AHI 8.5, RDI 13.8, Supine AHI 13.0, REM AHI 20.2, Low O2 87.9%, Time Spent ?88% 0.1 minutes / Time Spent ?89% 0.1 minutes.    Attending MD: PSG was personally reviewed in detail with the Sleep Medicine Fellow.  The above interpretation reflects our joint assessment and recommendations.        Electronically Signed By: Dante Watson MD (9/14/18)           Range(%) Time in range (min)   0.0 - 89.0 0.1   0.0 - 88.0 0.1         Stage Min(mm Hg) Max(mm Hg)   Wake - -   NREM(1+2+3) - -   REM - -       Range(mmHg) Time in range (min)   55.0 - 100.0 -   Excluded data <20.0 & >65.0 445.0

## 2018-09-17 LAB — SLPCOMP: NORMAL

## 2018-09-28 ENCOUNTER — OFFICE VISIT (OUTPATIENT)
Dept: SLEEP MEDICINE | Facility: CLINIC | Age: 38
End: 2018-09-28
Payer: COMMERCIAL

## 2018-09-28 VITALS
HEIGHT: 67 IN | SYSTOLIC BLOOD PRESSURE: 121 MMHG | WEIGHT: 246 LBS | DIASTOLIC BLOOD PRESSURE: 82 MMHG | RESPIRATION RATE: 16 BRPM | OXYGEN SATURATION: 97 % | HEART RATE: 71 BPM | BODY MASS INDEX: 38.61 KG/M2

## 2018-09-28 DIAGNOSIS — G47.33 OSA (OBSTRUCTIVE SLEEP APNEA): Primary | ICD-10-CM

## 2018-09-28 DIAGNOSIS — E66.812 CLASS 2 OBESITY DUE TO EXCESS CALORIES WITHOUT SERIOUS COMORBIDITY WITH BODY MASS INDEX (BMI) OF 38.0 TO 38.9 IN ADULT: ICD-10-CM

## 2018-09-28 DIAGNOSIS — E66.09 CLASS 2 OBESITY DUE TO EXCESS CALORIES WITHOUT SERIOUS COMORBIDITY WITH BODY MASS INDEX (BMI) OF 38.0 TO 38.9 IN ADULT: ICD-10-CM

## 2018-09-28 PROCEDURE — 99214 OFFICE O/P EST MOD 30 MIN: CPT | Performed by: INTERNAL MEDICINE

## 2018-09-28 NOTE — MR AVS SNAPSHOT
After Visit Summary   9/28/2018    Raúl Haque    MRN: [id number]           Patient Information     Date Of Birth          1980        Visit Information        Provider Department      9/28/2018 9:20 AM Christine Huitron MD [facility]        Today's Diagnoses     BURT (obstructive sleep apnea) Mild    -  1      Care Instructions    Dental appliance resources recommended by [facility]   Sleep [facility]   Zbigniew Garcia DDS, MS     [place] [facility]  [address] [place] [address] [address]  [place] [address]   Appointments: [phone] Ext: [phone]  Fax: [phone]   [email or web address]   Dental and Oral Surgery Clinic   Jonathan Lopez DDS   [address]   [place] [address]   Appointments: [phone]   [email or web address]/   [facility]_[email or web address]       Snoring and Sleep Apnea   Dental Treatment Center   Deshaun Cyr DDS   [address]   [address]   [place] [address]   Appointments: [phone]   Fax: [phone]   [email or web address]       [place] [facility]  Corbin Richmond DDS- takes medicare  [address], [place] [address]  [address], [place] [address] [phone]; [phone] (fax)  [employer]    [facility]  Jack Curran DDS  [facility]  [address]  [phone]  [place] [address]  [facility] [facility]   [facility]   Jonathan Lopez DDS   [facility]   [address],   [address]   [place] [address]   Appointments: [phone]   Fax: [phone]     [place] Office   Naldo Barnett DDS, MS   [DME Medicare]  [facility]   [address]   [address]   [place] [address]   Appointments: [phone]   Fax:  [phone]   Dr. Barnett accepts Medicaid patients.     [place] Office  Wily Heath BDS, MS  [address]  [address]  [place] [address]  Appointments: [phone]  Fax: [phone]    [facility]  Lars Morfin JEANINE  [[facility] Medicare]  [address]  [place] [address]  Appointments [phone]  Fax: [phone]    +[facility]   [facility]     Bernadine Cain DDS, PhD, MS   [place] Office   [facility]   [address],   [address]   [place] [address]   Appointments: [phone]   Fax: [phone]     [place] Office   [facility]   [address]   [address]   [place] [address]   Appointments: [phone]   Fax: [phone]     [facility]  Ayesha Pittman DDS  [facility]  [facility] [address] [place] [address] [phone] (Office)   [phone] (Fax    If you wish to choose your own dental sleep dentist, you may identify a provider close to you: [email or web address]          Follow-ups after your visit        Additional Services     SLEEP DENTAL REFERRAL       Dental appliance resources recommended by [facility]     Below is a list of dental appliance resources recommended by [facility]   If you wish to choose your own dental sleep dentist, you may identify a provider close to you: [email or web address]    [facility]   Sleep Medicine [facility]   Zbigniew Garcia DDS, [facility] [address]  [place] [address]   Appointments: [phone]  Fax: [phone]   Email: [email or web address]   Dental and Oral Surgery Clinic   Colten Reyes, RUBÉN Lopez DDS   [address]   [address]   [place] [address]   Appointments: [phone]    Website: [email or web address]    Snoring and Sleep Apnea   Dental Treatment Center   JEANINE Khalil DDS  [address]   [place] [address]   Appointments: [phone]   Fax: [phone]   Email: [email or web address]  Website: [employer]     [place] [facility]   Office 1   Corbin Richmond DDS - [DME Medicare]  [address]   [place] [address]  Appointments: [phone]  Fax: [phone]  Website: [employer]    [facility]   Office 2  Corbin Richmond DDS - [DME Medicare]  [address]  [place] [address]  Appointments: [phone]  Fax: [phone]  Website: [employer] Resolve    [facility]  Jack Curran DDS  [address] [place] [address]  Appointments: [phone]    [facility]   [place] Office   oJnathan Lopez DDS   [facility]   [address]   [place] [address]   Appointments: [phone]   Fax: [phone]   Website: [employer]     [facility]   [place] Office   Naldo Barnett DDS, MS - [DME Medicare]  [facility]   [address] [place] [address], [address]   [place] [address]   Appointments: [phone]   Fax: [phone]   Website: [employer]     [facility]  Lars Morfin DMD, MSD - [DME Medicare]  [address], [address]  [place] [address]  Appointments [phone]  Fax: [phone]  Website: [email or web address]    [facility]   [place] Office   Bernadine Cain DDS, PhD, [place]   [address]   [place] [address]   Appointments: [phone]   Fax: [phone]   Website: [email or web address].[employer]     [facility]   [place] Office   Bernadine Cain DDS, PhD, MS   [facility]   [address] [place] [address]  [address]   [place] [address]   Appointments: [phone]   Fax: [phone]   Website: [email or web address]     [facility]  Ayesha Pittman DDS  [facility]  [address] [place] [address]  Appointments: [phone]   Fax: [phone]    If you wish to choose your own dental sleep dentist, you may identify a provider close to you: [email or web address]      AHI: 8.5 PSG DATE: 9/10/2018     Return to clinic in 4 for Home Sleep Test to confirm adequacy of Treatment.    Other information regarding this referral: Mandibular repositioning appliance for BURT. If your insurance asks for a CPT code, it is .    Please be aware that coverage of these services is subject to the terms and limitations of your health insurance plan.  Call member services at your health plan with any benefit or coverage questions.      Please bring the following to your appointment:    >>   List of current medications   >>   This referral request   >>   Any documents/labs given to you for this referral                  Follow-up notes from your care team     Return in about 5 months (around 2/28/2019).      Who to contact     If you have questions or need follow up information about today's clinic visit or your schedule please contact [place] [facility] directly at [phone].  Normal or non-critical lab and imaging results will be communicated to you by MyChart, letter or phone within 4 business days after the clinic has received the results. If you do not hear from us within 7 days, please contact the clinic through [employer]Time[employer] or phone. If you have a critical or abnormal lab result, we will notify you by phone as soon as possible.  Submit refill requests through [employer] or call your pharmacy and they will forward the refill request to us. Please allow 3 business days for your refill to be completed.          Additional Information About Your Visit        [employer]  "Information     Joe gives you secure access to your electronic health record. If you see a primary care provider, you can also send messages to your care team and make appointments. If you have questions, please call your primary care clinic.  If you do not have a primary care provider, please call [phone] and they will assist you.        Care EveryWhere ID     This is your Care EveryWhere ID. This could be used by other organizations to access your [place] medical records  [id number]        Your Vitals Were     Pulse Respirations Height Pulse Oximetry BMI (Body Mass Index)       71 16 1.702 m (5' 7\") 97% 38.53 kg/m2        Blood Pressure from Last 3 Encounters:   09/28/18 121/82   07/19/18 138/87   07/10/18 138/82    Weight from Last 3 Encounters:   09/28/18 111.6 kg (246 lb)   07/19/18 108.9 kg (240 lb)   07/10/18 109.6 kg (241 lb 11.2 oz)              We Performed the Following     SLEEP DENTAL REFERRAL        Primary Care Provider Office Phone # Fax #    [place] Silvina Abbott, APRN [facility] [phone]       [address]  [facility] [address]        Equal Access to Services     KIMBERLEE ORTIZ : Hadii aad ku hadasho Soomaali, waaxda luqadaha, qaybta kaalmada adeegyada, elias louisin hayjeffryn carie black . So [facility] [phone].    ATENCIÓN: Si habla español, tiene a conner disposición servicios gratuitos de asistencia lingüística. Llame al [phone].    We comply with applicable federal civil rights laws and [place] laws. We do not discriminate on the basis of race, color, national origin, age, disability, sex, sexual orientation, or gender identity.            Thank you!     Thank you for choosing [facility]  for your care. Our goal is always to provide you with excellent care. Hearing back from our patients is one way we can continue to improve our services. Please take a few minutes to complete the written survey that you may receive in the mail after your " visit with us. Thank you!             Your Updated Medication List - Protect others around you: Learn how to safely use, store and throw away your medicines at www.disposemymeds.org.      Notice  As of 9/28/2018  9:31 AM    You have not been prescribed any medications.

## 2018-09-28 NOTE — PATIENT INSTRUCTIONS
Dental appliance resources recommended by [facility]        [facility]   Sleep Medicine [facility]   Zbigniew Garcia DDS, MS     [facility]  [address] [place] [address] [address]  [place] [address]   Appointments: [phone] Ext: [phone]  Fax: [phone]   [email or web address]   Dental and Oral Surgery Clinic   Jonathan Lopez DDS   [address] [facility] [place]   [place] [address]   Appointments: [phone]   [email or web address]   [email or web address]       Snoring and Sleep Apnea   Dental Treatment Center   Deshaun Cyr DDS   [address]   [address]   [place] [address]   Appointments: [phone]   Fax: [phone]   [employer]       [facility]  Corbin Richmond DDS- takes medicare  [address], [place] [address]  [address], [place] [address]  [phone]; [phone] (fax)  [place]    [facility]  Jack Curran DDS  [facility]  [address]  [phone]  [place] [address]  [facility]   [facility] Office   Jonathan Lopez DDS   [facility]   [address],   [address]   [place] [address]   Appointments: [phone]   Fax: [phone]     [place] [facility]   Naldo Barnett DDS, MS   [DME Medicare]  [facility]   [address]   [address]   [place] [address]   Appointments: [phone]   Fax: [phone]   Dr. Barnett accepts Medicaid patients.     [place] [facility]  Wily Heath BDS, MS  [address]  [address]  [place] [address]  Appointments: [phone]  Fax: [phone]    [facility]  Lars Morfin DDS  [DME Medicare]  [address]  [address]  [place] [address]  Appointments [phone]  Fax: [phone]    +[facility]    [facility]     Bernadine Cain DDS, PhD, [facility]   [facility]   [address],   [address]   [place] [address]   Appointments: [phone]   Fax: [phone]     [facility]   [address]   [address]   [place] [address]   Appointments: [phone]   Fax: [phone]     [facility]  Ayesha Pittman DDS  [facility] [address] [address] [phone] (Office)   [phone] (Fax    If you wish to choose your own dental sleep dentist, you may identify a provider close to you: [email or web address]

## 2018-09-28 NOTE — PROGRESS NOTES
Chief complaint: Follow-up of sleep study    History of Present Illness: 38-year-old gentleman here with his wife and 2 of his 4 children.  He is here today for follow-up of in lab polysomnography performed for the evaluation of poor sleep quality and daytime fatigue.  He has a history of very loud snoring for years.  Polysomnography was reviewed with him in detail today.  He was given a copy of the results and they are summarized as follows:  Study date 9/10/2018, normal sleep efficiency, normal sleep latency.  Overall apnea hypopnea index of 8.5, supine AHI 13, REM AHI 20  Overall arousal index normal    Patient denies any new sleep concerns.  Since his last visit he does have a now 6-week-old  he is interested in treating sleep apnea however he like to avoid CPAP therapy.  His wife notes that his sleep has improved a little bit since his initial visit.  He is not waking up at night as much and listening to pot cast.    Davis Seepiness Scale:0 (Less than 10 normal)    Past Medical History:   Diagnosis Date     Depression, major, in remission (H) 2003    meds for < 1 year     Obesity 2014     BURT (obstructive sleep apnea) Mild 2018    PSG at [facility] 9/10/2018 Wt 240 lbs BMI 37.7 AHI 8.5  Supine AHI 13, REM AHI 20       No Known Allergies    No current outpatient prescriptions on file.     No current facility-administered medications for this visit.        Social History     Social History     Marital status:      Spouse name: N/A     Number of children: N/A     Years of education: N/A     Occupational History     Not on file.     Social History Main Topics     Smoking status: Never Smoker     Smokeless tobacco: Never Used     Alcohol use Yes      Comment: 3 times per week     Drug use: No     Sexual activity: Yes     Partners: Female     Birth control/ protection: IUD     Other Topics Concern     Parent/Sibling W/ Cabg, Mi Or Angioplasty Before 65f 55m? No     Social History Narrative  "      Family History   Problem Relation Age of Onset     Cancer Mother      Lung     Depression Mother      Diabetes Father      Depression Brother      Depression Sister          EXAM: Obese gentleman alert, no distress  /82  Pulse 71  Resp 16  Ht 1.702 m (5' 7\")  Wt 111.6 kg (246 lb)  SpO2 97%  BMI 38.53 kg/m2  Psychiatric: Mood and affect appear normal    PSG date: 9/10/2018  Wt: 240 pounds, BMI 37  AHI: 8.5, supine AHI 13.1, REM AHI 20    ASSESSMENT: Mild sleep apnea with supine and REM predominance in a gentleman with perceived poor quality sleep and daytime fatigue.  Normal [place] however.  Patient is interested in treatment.  Due to his mood disorder and his perceived difficulties with sleep quality do agree this would be reasonable.    PLAN: We reviewed alternatives to CPAP including oral appliances and position restriction and the importance of weight control.  Patient is interested in proceeding with oral appliance.  He is up-to-date with his dental care.  The oral appliance was demonstrated.  Patient should follow-up in 4 months or so to review clinical response and at that time determine whether home sleep apnea tested testing should be done to document resolution of sleep apnea.  In the meantime patient should continue to work on improving sleep schedule, see previous instructions from his previous sleep provider.  Continue to work on weight control, follow-up with primary care for further counseling.  Patient is agreeable with this plan.    Twenty-five minutes spent with patient, >50% spent counseling and coordinating care.      Christine Huitron M.D.  Pulmonary/Critical Care/Sleep Medicine    The above note was dictated using voice recognition software and may include typographical errors. Please contact the author for any clarifications.          "

## 2019-09-30 ENCOUNTER — HEALTH MAINTENANCE LETTER (OUTPATIENT)
Age: 39
End: 2019-09-30

## 2020-12-08 ENCOUNTER — APPOINTMENT (OUTPATIENT)
Dept: OCCUPATIONAL MEDICINE | Facility: CLINIC | Age: 40
End: 2020-12-08

## 2020-12-08 PROCEDURE — 99000 SPECIMEN HANDLING OFFICE-LAB: CPT | Performed by: FAMILY MEDICINE

## 2021-01-15 ENCOUNTER — HEALTH MAINTENANCE LETTER (OUTPATIENT)
Age: 41
End: 2021-01-15

## 2021-04-22 ENCOUNTER — IMMUNIZATION (OUTPATIENT)
Dept: NURSING | Facility: CLINIC | Age: 41
End: 2021-04-22
Payer: COMMERCIAL

## 2021-04-22 PROCEDURE — 91300 PR COVID VAC PFIZER DIL RECON 30 MCG/0.3 ML IM: CPT

## 2021-04-22 PROCEDURE — 0001A PR COVID VAC PFIZER DIL RECON 30 MCG/0.3 ML IM: CPT

## 2021-05-13 ENCOUNTER — IMMUNIZATION (OUTPATIENT)
Dept: NURSING | Facility: CLINIC | Age: 41
End: 2021-05-13
Attending: INTERNAL MEDICINE
Payer: COMMERCIAL

## 2021-05-13 PROCEDURE — 0002A PR COVID VAC PFIZER DIL RECON 30 MCG/0.3 ML IM: CPT

## 2021-05-13 PROCEDURE — 91300 PR COVID VAC PFIZER DIL RECON 30 MCG/0.3 ML IM: CPT

## 2021-10-24 ENCOUNTER — HEALTH MAINTENANCE LETTER (OUTPATIENT)
Age: 41
End: 2021-10-24

## 2022-02-03 ENCOUNTER — APPOINTMENT (OUTPATIENT)
Dept: URBAN - METROPOLITAN AREA CLINIC 252 | Age: 42
Setting detail: DERMATOLOGY
End: 2022-02-03

## 2022-02-03 VITALS — HEIGHT: 67 IN | WEIGHT: 250 LBS | RESPIRATION RATE: 16 BRPM

## 2022-02-03 DIAGNOSIS — D22 MELANOCYTIC NEVI: ICD-10-CM

## 2022-02-03 PROBLEM — D48.5 NEOPLASM OF UNCERTAIN BEHAVIOR OF SKIN: Status: ACTIVE | Noted: 2022-02-03

## 2022-02-03 PROCEDURE — OTHER BIOPSY BY PUNCH METHOD: OTHER

## 2022-02-03 PROCEDURE — OTHER MIPS QUALITY: OTHER

## 2022-02-03 PROCEDURE — OTHER COUNSELING: OTHER

## 2022-02-03 PROCEDURE — 11104 PUNCH BX SKIN SINGLE LESION: CPT

## 2022-02-03 ASSESSMENT — LOCATION DETAILED DESCRIPTION DERM: LOCATION DETAILED: LEFT CLAVICULAR SKIN

## 2022-02-03 ASSESSMENT — LOCATION ZONE DERM: LOCATION ZONE: TRUNK

## 2022-02-03 ASSESSMENT — LOCATION SIMPLE DESCRIPTION DERM: LOCATION SIMPLE: LEFT CLAVICULAR SKIN

## 2022-02-03 NOTE — PROCEDURE: BIOPSY BY PUNCH METHOD
Validate That Anesthesia Is Not 0: No
X Size Of Lesion In Cm (Optional): 0
Detail Level: Detailed
Wound Care: Petrolatum
Home Suture Removal Text: - Patient or caregiver was provided with a sterile #11 blade and given verbal instructions for suture removal. \\n- If they have any questions, difficulties, or decide they would like the sutures removed in office,  then they will call [facility].
Anesthesia Type: 2% lidocaine with epinephrine
Was A Bandage Applied: Yes
Consent: - Verbal and written consent was obtained and risks were reviewed prior to procedure today. \\n- Risks discussed include but are not limited to scarring, infection, bleeding, scabbing, incomplete removal, nerve damage, pain, pruritus, and allergy to anesthesia.
Biopsy Type: H and E
Information: Selecting Yes will display possible errors in your note based on the variables you have selected. This validation is only offered as a suggestion for you. PLEASE NOTE THAT THE VALIDATION TEXT WILL BE REMOVED WHEN YOU FINALIZE YOUR NOTE. IF YOU WANT TO FAX A PRELIMINARY NOTE YOU WILL NEED TO TOGGLE THIS TO 'NO' IF YOU DO NOT WANT IT IN YOUR FAXED NOTE.
Anesthesia Volume In Cc (Will Not Render If 0): 0.3
Hemostasis: Pressure
Epidermal Sutures: gel foam
Billing Type: Client Bill
Post-Care Instructions: WOUND CARE:\\nDo NOT submerge wound in a bath, swimming pool, or hot tub for at least 1 week or for as long as there is an open wound. Gently cleanse the site daily with mild soap and water. Be careful NOT to allow a forceful stream of water to hit the biopsy site. After cleaning/showering, apply a thin layer of petrolatum ointment or Aquaphor in the wound followed by an adhesive bandage. Continue this process daily until healed. \\n\\nBLEEDING:\\nIf you develop persistent bleeding, apply firm and steady pressure over the dressing with gauze for 15 minutes. If bleeding persists, reapply pressure with an ice pack over the gauze for 15 minutes. NEVER APPLY ICE DIRECTLY TO THE SKIN. Do NOT peek under the gauze during these 15 minutes of pressure.  Call our office at [phone] if you cannot get the bleeding to stop. \\n\\nINFECTION:\\nSigns of infection may include increasing rather than decreasing pain (after the first few days), increasing redness/swelling/heat, draining pus, pink/red streaks around the wound, and fever or chills.  Please call our office immediately at [phone] if infection is suspected. It is normal to have some mild redness on or around the wound edges; this will lighten day by day and will become less tender.\\n\\nPAIN:\\nPain is usually minimal, but if needed you may take acetaminophen (Tylenol) every four hours as needed. Applying an ice pack over the dressing for 15-20 minutes every 2-3 hours will relieve swelling, lessen pain, and help minimize bruising. NEVER APPLY ICE DIRECTLY TO THE SKIN. Avoid ibuprofen (Advil, Motrin) and naproxen (Aleve) for the first 48 hours as these can increase bleeding.\\n\\nSWELLING AND BRUISING:\\nSwelling and bruising are common and temporary, usually lasting 1 - 2 weeks. It is more common in areas treated around the eyes, hands, and feet. In the days following the procedure, swelling and bruising can be minimized by keeping the affected areas elevated when possible, reducing salty foods, and applying ice packs over the dressing for 15-20 minutes every 2-3 hours. NEVER APPLY ICE DIRECTLY TO THE SKIN.\\n\\nITCHING:\\nItchiness on and around the wound is very common and can last several days to weeks after surgery. Mild itch is normal as the wound is healing. \\n\\nNERVE CHANGES:\\nNumbness is usually temporary, but it may last for several weeks to months. You may also experience sharp pains at the wound sight as it heals.  Mild pain is normal and will gradually improve with time.\\n \\nNO SMOKING:\\nSmoking will delay the healing process. If you smoke, we recommend trying to quit or at minimum reduce how much you smoke following a procedure.
Punch Size In Mm: 4
Path Notes Override (Will Replace All Of The Above Text): Area of punch is area of concern that has darkened over the past few months. The remaining lesion was shave removed.
Notification Instructions: - It can take up to 2 weeks to get a biopsy result. Please refrain from calling to request results until after 2 weeks.
Dressing: bandage

## 2022-02-03 NOTE — PROCEDURE: MIPS QUALITY
Detail Level: Detailed
Quality 130: Documentation Of Current Medications In The Medical Record: Current Medications Documented
Quality 226: Preventive Care And Screening: Tobacco Use: Screening And Cessation Intervention: Patient screened for tobacco use and is an ex/non-smoker
Quality 265: Biopsy Follow-Up: Biopsy results reviewed, communicated, tracked, and documented
Quality 431: Preventive Care And Screening: Unhealthy Alcohol Use - Screening: Patient not identified as an unhealthy alcohol user when screened for unhealthy alcohol use using a systematic screening method

## 2022-02-03 NOTE — HPI: SKIN LESION
Is This A New Presentation, Or A Follow-Up?: Skin Lesion
Additional History: Size has not changed but a dark spot showed up over the last few months.

## 2022-02-13 ENCOUNTER — HEALTH MAINTENANCE LETTER (OUTPATIENT)
Age: 42
End: 2022-02-13

## 2022-09-07 ENCOUNTER — VIRTUAL VISIT (OUTPATIENT)
Dept: FAMILY MEDICINE | Facility: CLINIC | Age: 42
End: 2022-09-07
Payer: COMMERCIAL

## 2022-09-07 DIAGNOSIS — F32.5 DEPRESSION, MAJOR, IN REMISSION (H): Primary | ICD-10-CM

## 2022-09-07 PROCEDURE — 99203 OFFICE O/P NEW LOW 30 MIN: CPT | Mod: 95 | Performed by: NURSE PRACTITIONER

## 2022-09-07 NOTE — PROGRESS NOTES
Raúl is a 42 year old who is being evaluated via a billable video visit.      How would you like to obtain your AVS? MyChart  If the video visit is dropped, the invitation should be resent by: Send to e-mail at: [email or web address]  Will anyone else be joining your video visit? No        Assessment & Plan   Problem List Items Addressed This Visit     Depression, major, in remission (H) - Primary       he has established with therapy and is currently feeling better; discussed that if he starts to feel worse again we can re-start wellbutrin  mg; Plan for a physical in the next few months.      30 minutes spent on the date of the encounter doing chart review, history and exam, documentation and further activities per the note       See Patient Instructions    No follow-ups on file.    [facility]    Willa Olsen is a 42 year old, presenting for the following health issues:  Depression      HPI      Depression Followup    How are you doing with your depression since your last visit? Improved, a little better maybe about the same    Are you having other symptoms that might be associated with depression? No    Have you had a significant life event?  Job Concerns, potential job change     Are you feeling anxious or having panic attacks?   Yes:  anxious but not sure about the panic attacks    Do you have any concerns with your use of alcohol or other drugs? No     Has been on wellbutrin in the past which was helpful; has recently started counseling    Social History     Tobacco Use     Smoking status: Never Smoker     Smokeless tobacco: Never Used   Substance Use Topics     Alcohol use: Yes     Comment: 3 times per week     Drug use: No     No flowsheet data found.  No flowsheet data found.  No flowsheet data found.  No flowsheet data found.    Suicide Assessment Five-step Evaluation and Treatment (SAFE-T)      How many servings of fruits and vegetables  do you eat daily?  2-3    On average, how many sweetened beverages do you drink each day (Examples: soda, juice, sweet tea, etc.  Do NOT count diet or artificially sweetened beverages)?   0    How many days per week do you exercise enough to make your heart beat faster? 3 or less    How many minutes a day do you exercise enough to make your heart beat faster? 30 - 60  How many days per week do you miss taking your medication? Not applicable, not currently taking any medication        Review of Systems   Constitutional, HEENT, cardiovascular, pulmonary, gi and gu systems are negative, except as otherwise noted.      Objective           Vitals:  No vitals were obtained today due to virtual visit.    Physical Exam   GENERAL: Healthy, alert and no distress  EYES: Eyes grossly normal to inspection.  No discharge or erythema, or obvious scleral/conjunctival abnormalities.  RESP: No audible wheeze, cough, or visible cyanosis.  No visible retractions or increased work of breathing.    SKIN: Visible skin clear. No significant rash, abnormal pigmentation or lesions.  NEURO: Cranial nerves grossly intact.  Mentation and speech appropriate for age.  PSYCH: Mentation appears normal, affect normal/bright, judgement and insight intact, normal speech and appearance well-groomed.        Video-Visit Details    Video Start Time: 5 pm    Type of service:  Video Visit    Video End Time:5:22 pm  Originating Location (pt. Location): Home    Distant Location (provider location):  [facility]     Platform used for Video Visit: [employer]

## 2022-10-15 ENCOUNTER — HEALTH MAINTENANCE LETTER (OUTPATIENT)
Age: 42
End: 2022-10-15

## 2022-11-30 ASSESSMENT — ENCOUNTER SYMPTOMS
NAUSEA: 0
EYE PAIN: 0
DIZZINESS: 0
HEMATOCHEZIA: 0
WEAKNESS: 0
ARTHRALGIAS: 0
SORE THROAT: 0
FREQUENCY: 0
PALPITATIONS: 0
COUGH: 0
CHILLS: 0
CONSTIPATION: 0
DIARRHEA: 0
FEVER: 0
HEADACHES: 0
HEMATURIA: 0
MYALGIAS: 0
NERVOUS/ANXIOUS: 0
ABDOMINAL PAIN: 0
DYSURIA: 0
JOINT SWELLING: 0
HEARTBURN: 0
SHORTNESS OF BREATH: 0
PARESTHESIAS: 0

## 2022-12-01 ENCOUNTER — LAB (OUTPATIENT)
Dept: LAB | Facility: CLINIC | Age: 42
End: 2022-12-01
Payer: COMMERCIAL

## 2022-12-01 ENCOUNTER — OFFICE VISIT (OUTPATIENT)
Dept: FAMILY MEDICINE | Facility: CLINIC | Age: 42
End: 2022-12-01
Payer: COMMERCIAL

## 2022-12-01 VITALS
DIASTOLIC BLOOD PRESSURE: 79 MMHG | BODY MASS INDEX: 37.69 KG/M2 | HEART RATE: 61 BPM | HEIGHT: 69 IN | OXYGEN SATURATION: 99 % | WEIGHT: 254.5 LBS | TEMPERATURE: 97 F | RESPIRATION RATE: 13 BRPM | SYSTOLIC BLOOD PRESSURE: 132 MMHG

## 2022-12-01 DIAGNOSIS — Z13.1 SCREENING FOR DIABETES MELLITUS: ICD-10-CM

## 2022-12-01 DIAGNOSIS — Z00.00 ROUTINE HISTORY AND PHYSICAL EXAMINATION OF ADULT: Primary | ICD-10-CM

## 2022-12-01 DIAGNOSIS — Z13.220 SCREENING CHOLESTEROL LEVEL: ICD-10-CM

## 2022-12-01 DIAGNOSIS — Z00.00 ROUTINE HISTORY AND PHYSICAL EXAMINATION OF ADULT: ICD-10-CM

## 2022-12-01 DIAGNOSIS — Z11.59 NEED FOR HEPATITIS C SCREENING TEST: ICD-10-CM

## 2022-12-01 DIAGNOSIS — E66.01 MORBID OBESITY (H): ICD-10-CM

## 2022-12-01 LAB
ALBUMIN SERPL BCG-MCNC: 4.6 G/DL (ref 3.5–5.2)
ALP SERPL-CCNC: 71 U/L (ref 40–129)
ALT SERPL W P-5'-P-CCNC: 21 U/L (ref 10–50)
ANION GAP SERPL CALCULATED.3IONS-SCNC: 12 MMOL/L (ref 7–15)
AST SERPL W P-5'-P-CCNC: 21 U/L (ref 10–50)
BILIRUB SERPL-MCNC: 0.4 MG/DL
BUN SERPL-MCNC: 16.3 MG/DL (ref 6–20)
CALCIUM SERPL-MCNC: 9 MG/DL (ref 8.6–10)
CHLORIDE SERPL-SCNC: 108 MMOL/L (ref 98–107)
CHOLEST SERPL-MCNC: 177 MG/DL
CREAT SERPL-MCNC: 1.01 MG/DL (ref 0.67–1.17)
DEPRECATED HCO3 PLAS-SCNC: 24 MMOL/L (ref 22–29)
GFR SERPL CREATININE-BSD FRML MDRD: >90 ML/MIN/1.73M2
GLUCOSE SERPL-MCNC: 97 MG/DL (ref 70–99)
HBA1C MFR BLD: 5 % (ref 0–5.6)
HCV AB SERPL QL IA: NONREACTIVE
HDLC SERPL-MCNC: 37 MG/DL
LDLC SERPL CALC-MCNC: 100 MG/DL
NONHDLC SERPL-MCNC: 140 MG/DL
POTASSIUM SERPL-SCNC: 4.1 MMOL/L (ref 3.4–5.3)
PROT SERPL-MCNC: 7.1 G/DL (ref 6.4–8.3)
SODIUM SERPL-SCNC: 144 MMOL/L (ref 136–145)
TRIGL SERPL-MCNC: 199 MG/DL

## 2022-12-01 PROCEDURE — 80053 COMPREHEN METABOLIC PANEL: CPT

## 2022-12-01 PROCEDURE — 86803 HEPATITIS C AB TEST: CPT

## 2022-12-01 PROCEDURE — 99396 PREV VISIT EST AGE 40-64: CPT | Mod: 25 | Performed by: NURSE PRACTITIONER

## 2022-12-01 PROCEDURE — 90686 IIV4 VACC NO PRSV 0.5 ML IM: CPT | Performed by: NURSE PRACTITIONER

## 2022-12-01 PROCEDURE — 36415 COLL VENOUS BLD VENIPUNCTURE: CPT

## 2022-12-01 PROCEDURE — 91312 COVID-19 VACCINE BIVALENT BOOSTER 12+ (PFIZER): CPT | Performed by: NURSE PRACTITIONER

## 2022-12-01 PROCEDURE — 80061 LIPID PANEL: CPT

## 2022-12-01 PROCEDURE — 0124A COVID-19 VACCINE BIVALENT BOOSTER 12+ (PFIZER): CPT | Performed by: NURSE PRACTITIONER

## 2022-12-01 PROCEDURE — 90471 IMMUNIZATION ADMIN: CPT | Performed by: NURSE PRACTITIONER

## 2022-12-01 PROCEDURE — 83036 HEMOGLOBIN GLYCOSYLATED A1C: CPT

## 2022-12-01 ASSESSMENT — ENCOUNTER SYMPTOMS
NAUSEA: 0
MYALGIAS: 0
HEMATOCHEZIA: 0
WEAKNESS: 0
FEVER: 0
FREQUENCY: 0
ABDOMINAL PAIN: 0
DIARRHEA: 0
CONSTIPATION: 0
HEADACHES: 0
COUGH: 0
JOINT SWELLING: 0
PARESTHESIAS: 0
NERVOUS/ANXIOUS: 0
DYSURIA: 0
SHORTNESS OF BREATH: 0
ARTHRALGIAS: 0
HEARTBURN: 0
DIZZINESS: 0
PALPITATIONS: 0
HEMATURIA: 0
EYE PAIN: 0
CHILLS: 0
SORE THROAT: 0

## 2022-12-01 ASSESSMENT — PATIENT HEALTH QUESTIONNAIRE - PHQ9
SUM OF ALL RESPONSES TO PHQ QUESTIONS 1-9: 0
SUM OF ALL RESPONSES TO PHQ QUESTIONS 1-9: 0
10. IF YOU CHECKED OFF ANY PROBLEMS, HOW DIFFICULT HAVE THESE PROBLEMS MADE IT FOR YOU TO DO YOUR WORK, TAKE CARE OF THINGS AT HOME, OR GET ALONG WITH OTHER PEOPLE: NOT DIFFICULT AT ALL

## 2022-12-01 ASSESSMENT — PAIN SCALES - GENERAL: PAINLEVEL: NO PAIN (0)

## 2022-12-01 NOTE — PROGRESS NOTES
SUBJECTIVE:   CC: Raúl is an 42 year old who presents for preventative health visit.   Patient has been advised of split billing requirements and indicates understanding: Yes  Healthy Habits:     Getting at least 3 servings of Calcium per day:  NO    Bi-annual eye exam:  Yes    Dental care twice a year:  Yes    Sleep apnea or symptoms of sleep apnea:  Excessive snoring    Diet:  Regular (no restrictions)    Frequency of exercise:  2-3 days/week    Duration of exercise:  15-30 minutes    Taking medications regularly:  No    Barriers to taking medications:  None    Medication side effects:  Not applicable    PHQ-2 Total Score: 0    Additional concerns today:  No      Mood has been stable; no concerns. Snores, but otherwise sleeping well. Feels rested in the morning.  Started a new job at [employer] last month, going well. Has been doing a basketball league once per week.  Today's PHQ-2 Score:   PHQ-2 ( 1999 [employer]) 11/30/2022   Q1: Little interest or pleasure in doing things 0   Q2: Feeling down, depressed or hopeless 0   PHQ-2 Score 0   Q1: Little interest or pleasure in doing things Not at all   Q2: Feeling down, depressed or hopeless Not at all   PHQ-2 Score 0       Have you ever done Advance Care Planning? (For example, a Health Directive, POLST, or a discussion with a medical provider or your loved ones about your wishes): No, advance care planning information given to patient to review.  Patient plans to discuss their wishes with loved ones or provider.      Social History     Tobacco Use     Smoking status: Never     Smokeless tobacco: Never   Substance Use Topics     Alcohol use: Yes     Comment: 1-2 times per week     If you drink alcohol do you typically have >3 drinks per day or >7 drinks per week? No    Alcohol Use 11/30/2022   Prescreen: >3 drinks/day or >7 drinks/week? No   Prescreen: >3 drinks/day or >7 drinks/week? -   No flowsheet data found.    Last PSA: No results found for: PSA    Reviewed orders  with patient. Reviewed health maintenance and updated orders accordingly   Reviewed and updated as needed this visit by clinical staff   Tobacco  Allergies  Meds              Reviewed and updated as needed this visit by Provider                     Review of Systems   Constitutional: Negative for chills and fever.   HENT: Negative for congestion, ear pain, hearing loss and sore throat.    Eyes: Negative for pain and visual disturbance.   Respiratory: Negative for cough and shortness of breath.    Cardiovascular: Negative for chest pain, palpitations and peripheral edema.   Gastrointestinal: Negative for abdominal pain, constipation, diarrhea, heartburn, hematochezia and nausea.   Genitourinary: Negative for dysuria, frequency, genital sores, hematuria, impotence, penile discharge and urgency.   Musculoskeletal: Negative for arthralgias, joint swelling and myalgias.   Skin: Negative for rash.   Neurological: Negative for dizziness, weakness, headaches and paresthesias.   Psychiatric/Behavioral: Negative for mood changes. The patient is not nervous/anxious.        OBJECTIVE:   There were no vitals taken for this visit.    Physical Exam  GENERAL: healthy, alert and no distress  EYES: Eyes grossly normal to inspection, PERRL and conjunctivae and sclerae normal  NECK: no adenopathy, no asymmetry, masses, or scars and thyroid normal to palpation  RESP: lungs clear to auscultation - no rales, rhonchi or wheezes  CV: regular rate and rhythm, normal S1 S2, no S3 or S4, no murmur, click or rub, no peripheral edema and peripheral pulses strong  ABDOMEN: soft, nontender, no hepatosplenomegaly, no masses and bowel sounds normal  MS: no gross musculoskeletal defects noted, no edema  PSYCH: mentation appears normal, affect normal/bright    Diagnostic Test Results:  Labs reviewed in Epic  No results found for this or any previous visit (from the past 24 hour(s)).    ASSESSMENT/PLAN:       ICD-10-CM    1. Routine history and  physical examination of adult  Z00.00 Comprehensive metabolic panel (BMP + Alb, Alk Phos, ALT, AST, Total. Bili, TP)      2. Need for hepatitis C screening test  Z11.59       3. Screening cholesterol level  Z13.220 Lipid panel reflex to direct LDL Fasting      4. Screening for diabetes mellitus  Z13.1 Hemoglobin A1c      5. Morbid obesity (H)  E66.01               COUNSELING:   Reviewed preventive health counseling, as reflected in patient instructions       Regular exercise       Healthy diet/nutrition        He reports that he has never smoked. He has never used smokeless tobacco.        [facility]  Answers for HPI/ROS submitted by the patient on 12/1/2022  If you checked off any problems, how difficult have these problems made it for you to do your work, take care of things at home, or get along with other people?: Not difficult at all  PHQ9 TOTAL SCORE: 0

## 2023-11-01 ENCOUNTER — PATIENT OUTREACH (OUTPATIENT)
Dept: CARE COORDINATION | Facility: CLINIC | Age: 43
End: 2023-11-01
Payer: COMMERCIAL

## 2023-11-15 ENCOUNTER — PATIENT OUTREACH (OUTPATIENT)
Dept: CARE COORDINATION | Facility: CLINIC | Age: 43
End: 2023-11-15
Payer: COMMERCIAL

## 2024-01-07 ENCOUNTER — HEALTH MAINTENANCE LETTER (OUTPATIENT)
Age: 44
End: 2024-01-07

## 2024-02-01 ENCOUNTER — NURSE TRIAGE (OUTPATIENT)
Dept: FAMILY MEDICINE | Facility: CLINIC | Age: 44
End: 2024-02-01
Payer: COMMERCIAL

## 2024-02-01 NOTE — TELEPHONE ENCOUNTER
Sob and cough, was given an cough medicine in Urgent care, but no inhaler was hoping to get something for getting SOB with exertion.   Has history of exercise induced asthma in high school.   Please advise.   Thanks,   Silvano Mckeon RN  [facility]         Reason for Disposition   Difficulty breathing and only from stuffy nose or runny nose from common cold   Colds with no complications    Additional Information   Negative: SEVERE difficulty breathing (e.g., struggling for each breath, speaks in single words, pulse > 120)   Negative: Breathing stopped and hasn't returned   Negative: Choking on something   Negative: Bluish (or gray) lips or face   Negative: Difficult to awaken or acting confused (e.g., disoriented, slurred speech)   Negative: Passed out (i.e., fainted, collapsed and was not responding)   Negative: Wheezing started suddenly after medicine, an allergic food, or bee sting   Negative: Stridor (harsh sound while breathing in)   Negative: Slow, shallow and weak breathing   Negative: Sounds like a life-threatening emergency to the triager   Negative: SEVERE difficulty breathing (e.g., struggling for each breath, speaks in single words)   Negative: Very weak (can't stand)   Negative: Sounds like a life-threatening emergency to the triager   Negative: Difficulty breathing and not from stuffy nose (e.g., not relieved by cleaning out the nose)   Negative: Runny nose is caused by pollen or other allergies   Negative: Cough is main symptom   Negative: Sore throat is main symptom   Negative: Fever > 103 F (39.4 C)   Negative: Fever > 101 F (38.3 C) and over 60 years of age   Negative: Fever > 100.0 F (37.8 C) and has diabetes mellitus or a weak immune system (e.g., HIV positive, cancer chemotherapy, organ transplant, splenectomy, chronic steroids)   Negative: Fever > 100.0 F (37.8 C) and bedridden (e.g., CVA, chronic illness, recovering from surgery)   Negative: Patient sounds very  "sick or weak to the triager   Negative: Fever present > 3 days (72 hours)   Negative: Fever returns after gone for over 24 hours and symptoms worse or not improved   Negative: Sinus pain (not just congestion) and fever   Negative: Earache   Negative: Sinus congestion (pressure, fullness) present > 10 days   Negative: Nasal discharge present > 10 days   Negative: Using nasal washes and pain medicine > 24 hours and sinus pain (lower forehead, cheekbone, or eye) persists   Negative: Patient wants to be seen   Negative: Sore throat present > 5 days    Answer Assessment - Initial Assessment Questions  1. RESPIRATORY STATUS: \"Describe your breathing?\" (e.g., wheezing, shortness of breath, unable to speak, severe coughing)     SOB, cough is better   2. ONSET: \"When did this breathing problem begin?\"       This started about a week ago   3. PATTERN \"Does the difficult breathing come and go, or has it been constant since it started?\"       Yes. The sob developed at the same time  4. SEVERITY: \"How bad is your breathing?\" (e.g., mild, moderate, severe)       - MODERATE: SOB at rest, SOB with minimal exertion and prefers to sit, cannot lie down flat, speaks in phrases, mild retractions, audible wheezing, pulse 100-120.     5. RECURRENT SYMPTOM: \"Have you had difficulty breathing before?\" If Yes, ask: \"When was the last time?\" and \"What happened that time?\"       No   6. CARDIAC HISTORY: \"Do you have any history of heart disease?\" (e.g., heart attack, angina, bypass surgery, angioplasty)       No   7. LUNG HISTORY: \"Do you have any history of lung disease?\"  (e.g., pulmonary embolus, asthma, emphysema)      No   8. CAUSE: \"What do you think is causing the breathing problem?\"       Cold, sorts induce asthma in high   9. OTHER SYMPTOMS: \"Do you have any other symptoms? (e.g., dizziness, runny nose, cough, chest pain, fever)        10. O2 SATURATION MONITOR:  \"Do you use an oxygen saturation monitor (pulse oximeter) at home?\" If " "Yes, ask: \"What is your reading (oxygen level) today?\" \"What is your usual oxygen saturation reading?\" (e.g., 95%)        No   11. PREGNANCY: \"Is there any chance you are pregnant?\" \"When was your last menstrual period?\"        No   12. TRAVEL: \"Have you traveled out of the country in the last month?\" (e.g., travel history, exposures)         No    Protocols used: Breathing Difficulty-A-[place], Common Cold-A-[place]    "

## 2024-02-14 ENCOUNTER — APPOINTMENT (OUTPATIENT)
Dept: URBAN - METROPOLITAN AREA CLINIC 254 | Age: 44
Setting detail: DERMATOLOGY
End: 2024-02-14

## 2024-02-14 VITALS — HEIGHT: 67 IN | WEIGHT: 240 LBS

## 2024-02-14 DIAGNOSIS — L82.1 OTHER SEBORRHEIC KERATOSIS: ICD-10-CM

## 2024-02-14 PROCEDURE — OTHER COUNSELING: OTHER

## 2024-02-14 PROCEDURE — OTHER MIPS QUALITY: OTHER

## 2024-02-14 PROCEDURE — 99212 OFFICE O/P EST SF 10 MIN: CPT

## 2024-02-14 PROCEDURE — OTHER REASSURANCE: OTHER

## 2024-02-14 ASSESSMENT — LOCATION DETAILED DESCRIPTION DERM: LOCATION DETAILED: SUPRAPUBIC SKIN

## 2024-02-14 ASSESSMENT — LOCATION SIMPLE DESCRIPTION DERM: LOCATION SIMPLE: GROIN

## 2024-02-14 ASSESSMENT — LOCATION ZONE DERM: LOCATION ZONE: TRUNK

## 2024-02-14 NOTE — HPI: SKIN LESIONS
How Severe Is Your Skin Lesion?: mild
Have Your Skin Lesions Been Treated?: not been treated
Is This A New Presentation, Or A Follow-Up?: Skin Lesion
Additional History: Patient presents with concerning lesion on the pubic area.

## 2025-01-25 ENCOUNTER — HEALTH MAINTENANCE LETTER (OUTPATIENT)
Age: 45
End: 2025-01-25